# Patient Record
Sex: FEMALE
[De-identification: names, ages, dates, MRNs, and addresses within clinical notes are randomized per-mention and may not be internally consistent; named-entity substitution may affect disease eponyms.]

---

## 2022-07-26 ENCOUNTER — NURSE TRIAGE (OUTPATIENT)
Dept: OTHER | Facility: CLINIC | Age: 32
End: 2022-07-26

## 2022-07-26 NOTE — TELEPHONE ENCOUNTER
Subjective: Caller states \"I am having vertigo. It began Sunday after getting off the plane. I have had this happen before but it did not last this long. \"     Current Symptoms: dizziness-spinning/tilting when going from laying to sitting and can occur when going from sitting to standing. Bending over(when coming up). Denies syncopal feeling. N/V-emesis x 1    Intermittent Headache- front around eyes and forehead. . Rates 4-5/10. Stuffy nose. Denies runny nose, ear pain. States     Onset: 2 days ago; worsening    Associated Symptoms: reduced activity, increased wakefulness    Pain Severity: 3/10; fogginess; intermittent, waxing and waning    Temperature: n/a     What has been tried: naproxen- did not help headache. LMP: NA Pregnant: No    Recommended disposition: See PCP within 24 Hours    Care advice provided, patient verbalizes understanding; denies any other questions or concerns; instructed to call back for any new or worsening symptoms. Unsure what caller will do. This triage is a result of a call to [facility] a Nurse. Please do not respond to the triage nurse through this encounter. Any subsequent communication should be directly with the patient.         Reason for Disposition   [1] MODERATE dizziness (e.g., vertigo; feels very unsteady, interferes with normal activities) AND [2] has NOT been evaluated by physician for this    Protocols used: Dizziness - Vertigo-ADULT-